# Patient Record
Sex: FEMALE | Race: WHITE | NOT HISPANIC OR LATINO | Employment: OTHER | URBAN - NONMETROPOLITAN AREA
[De-identification: names, ages, dates, MRNs, and addresses within clinical notes are randomized per-mention and may not be internally consistent; named-entity substitution may affect disease eponyms.]

---

## 2017-04-27 ENCOUNTER — OFFICE VISIT (OUTPATIENT)
Dept: RETAIL CLINIC | Facility: CLINIC | Age: 72
End: 2017-04-27

## 2017-04-27 VITALS
HEART RATE: 70 BPM | TEMPERATURE: 97.9 F | OXYGEN SATURATION: 99 % | WEIGHT: 136.4 LBS | SYSTOLIC BLOOD PRESSURE: 123 MMHG | RESPIRATION RATE: 14 BRPM | DIASTOLIC BLOOD PRESSURE: 74 MMHG

## 2017-04-27 DIAGNOSIS — J06.9 ACUTE URI: Primary | ICD-10-CM

## 2017-04-27 PROCEDURE — 99202 OFFICE O/P NEW SF 15 MIN: CPT | Performed by: NURSE PRACTITIONER

## 2017-04-27 RX ORDER — ALPRAZOLAM 0.5 MG/1
0.5 TABLET ORAL DAILY
COMMUNITY

## 2017-04-27 NOTE — PROGRESS NOTES
Subjective   Viri Zabala is a 72 y.o. female.     History of Present Illness   Viri Zabala presents today with complaints of runny nose, sore throat, and cough for the past few days.  She states she has a lot of nasal drainage causing her throat to be irritated.  She has not had much congestion.  Her cough is productive of yellow-green sputum.  No SOA or wheezing.  She has had a low grade fever.  She has been taking Dayquil.    The following portions of the patient's history were reviewed and updated as appropriate: allergies, current medications, past family history, past medical history, past social history, past surgical history and problem list.    Review of Systems   Constitutional: Positive for fever (low grade).   HENT: Positive for postnasal drip, rhinorrhea and sore throat. Negative for congestion and trouble swallowing.    Respiratory: Positive for cough. Negative for shortness of breath and wheezing.        Objective   Physical Exam   Constitutional: She is oriented to person, place, and time. She appears well-developed and well-nourished.   HENT:   Right Ear: Tympanic membrane and ear canal normal.   Left Ear: Tympanic membrane and ear canal normal.   Nose: Nose normal.   Mouth/Throat: Uvula is midline and mucous membranes are normal. Posterior oropharyngeal erythema (streaking, thin, clear post nasal drainage noted) present. No oropharyngeal exudate or posterior oropharyngeal edema.   Cardiovascular: Regular rhythm and normal heart sounds.    Pulmonary/Chest: Effort normal and breath sounds normal. She has no wheezes. She has no rhonchi.   Dry cough present   Lymphadenopathy:   No adenopathy   Neurological: She is alert and oriented to person, place, and time.   Skin: Skin is warm and dry.   Vitals reviewed.      Assessment/Plan   [place] was seen today for sinus problem.    Diagnoses and all orders for this visit:    Acute URI      Discussed use of antihistamines and expectorant/cough  suppressant combo. Can purchase OTC.  If symptoms persist or worsen, follow up with PCP.